# Patient Record
Sex: FEMALE | Race: WHITE | Employment: FULL TIME | URBAN - METROPOLITAN AREA
[De-identification: names, ages, dates, MRNs, and addresses within clinical notes are randomized per-mention and may not be internally consistent; named-entity substitution may affect disease eponyms.]

---

## 2024-03-14 NOTE — PROGRESS NOTES
Social History:   Social History     Tobacco Use    Smoking status: Not on file    Smokeless tobacco: Not on file   Substance Use Topics    Alcohol use: Not on file       ALLERGIES: No Known Allergies     Patient Medications    Current Outpatient Medications   Medication Sig    ibuprofen (ADVIL;MOTRIN) 800 MG tablet     ALPRAZolam (XANAX) 1 MG tablet TAKE 0.5 TO 1 TABLET (0.5 - 1 MG) BY MOUTH AS NEEDED FOR ANXIETY    amphetamine-dextroamphetamine (ADDERALL) 20 MG tablet Take 1 tablet by mouth 3 times daily. Max Daily Amount: 3 tablets    zolpidem (AMBIEN) 10 MG tablet Take 1 tablet by mouth nightly as needed. Max Daily Amount: 10 mg    QUEtiapine (SEROQUEL) 25 MG tablet 1 tablet    Paragard Intrauterine Copper IUD 1 each by IntraUTERine route once     No current facility-administered medications for this visit.         ROS/Meds/PSH/PMH/FH/SH: I personally reviewed the patients standard intake form.     Physical Exam:      Cervical: PE: General: Awake, alert, no distress.  HEENT: Mucous membranes moist and pink. Psych: Appropriate and conversant. Derm: No rash. Gait: Unassisted, non-ataxic.  MS: There is no pain with cervical flexion or extension. She has right neck pain with right cervical facet load.  Left cervical facet load is negative. Strength is 5/5 and symmetric in the BUE's. There is mild pain with ROM of the right shoulder in any plane. Nontender cervical spine centrally.  Mild tenderness to palpation over the right cervical paraspinals and right shoulder.  Neurological: Sensation to light touch is intact in the BUE's. Bilateral Umana's maneuver is negative. Reflexes are trace and symmetric over the bilateral upper extremities.      VITALS: @IPVITALS(8:)@ , @TMAX(24)@       Failed to redirect to the Timeline version of the REVFS SmartLink.             No results found for any visits on 03/19/24.          Assessment and Plan:     ICD-10-CM    1. Cervical pain (neck)  M54.2 MRI CERVICAL SPINE WO CONTRAST

## 2024-03-19 ENCOUNTER — OFFICE VISIT (OUTPATIENT)
Dept: ORTHOPEDIC SURGERY | Age: 34
End: 2024-03-19
Payer: COMMERCIAL

## 2024-03-19 VITALS — BODY MASS INDEX: 24.35 KG/M2 | WEIGHT: 129 LBS | HEIGHT: 61 IN

## 2024-03-19 DIAGNOSIS — M54.2 CERVICAL PAIN (NECK): Primary | ICD-10-CM

## 2024-03-19 DIAGNOSIS — M25.511 RIGHT SHOULDER PAIN, UNSPECIFIED CHRONICITY: ICD-10-CM

## 2024-03-19 PROCEDURE — 99204 OFFICE O/P NEW MOD 45 MIN: CPT | Performed by: PHYSICAL MEDICINE & REHABILITATION

## 2024-03-19 RX ORDER — QUETIAPINE FUMARATE 25 MG/1
25 TABLET, FILM COATED ORAL
COMMUNITY

## 2024-03-19 RX ORDER — ZOLPIDEM TARTRATE 10 MG/1
10 TABLET ORAL NIGHTLY PRN
COMMUNITY

## 2024-03-19 RX ORDER — IBUPROFEN 800 MG/1
TABLET ORAL
COMMUNITY
Start: 2021-11-08

## 2024-03-19 RX ORDER — ALPRAZOLAM 1 MG/1
TABLET ORAL
COMMUNITY

## 2024-03-19 RX ORDER — COPPER 313.4 MG/1
1 INTRAUTERINE DEVICE INTRAUTERINE ONCE
COMMUNITY

## 2024-03-19 RX ORDER — DEXTROAMPHETAMINE SACCHARATE, AMPHETAMINE ASPARTATE, DEXTROAMPHETAMINE SULFATE AND AMPHETAMINE SULFATE 5; 5; 5; 5 MG/1; MG/1; MG/1; MG/1
1 TABLET ORAL 3 TIMES DAILY
COMMUNITY

## 2024-04-02 ENCOUNTER — TELEPHONE (OUTPATIENT)
Dept: ORTHOPEDIC SURGERY | Age: 34
End: 2024-04-02

## 2024-04-02 NOTE — TELEPHONE ENCOUNTER
Call returned to patient and she has been scheduled for a virtual visit with Dr. Patel to discuss MRI results.

## 2024-04-02 NOTE — TELEPHONE ENCOUNTER
PT WAS TOLD HER APPT FOR [id number]  WAS A TELEHEALTH APPT.BY FONT DESK AT [place],SHE WANTS TO KNOW WHEN SHE CAN HAVE PHONE VIIST .THANKS

## 2024-04-03 ENCOUNTER — TELEMEDICINE (OUTPATIENT)
Dept: ORTHOPEDIC SURGERY | Age: 34
End: 2024-04-03
Payer: COMMERCIAL

## 2024-04-03 DIAGNOSIS — M54.2 CERVICAL PAIN (NECK): Primary | ICD-10-CM

## 2024-04-03 PROCEDURE — 99214 OFFICE O/P EST MOD 30 MIN: CPT | Performed by: PHYSICAL MEDICINE & REHABILITATION

## 2024-04-03 NOTE — PROGRESS NOTES
[facility]  Consultation Note  Virtual/Telephone Visit     Patient ID:  Name: Ryder Green  MRN: [id number]  AGE: 33 y.o.  : 1990    Date of Consultation:  April 3, 2024    CC:   Chief Complaint   Patient presents with    Neck Pain         HPI: Ms. Green is a 33-year-old female who presents today for follow-up of neck and shoulder pain.  MRI cervical spine was performed 2024.  I did review these films.  On my review the films, she does have disc protrusions at C4-5, C5-6, C6-7, and to lesser extent C7-T1.  At C5-6, this is most pronounced, resulting in significant bilateral lateral recess narrowing, right greater than left.  She has multilevel degenerative disc changes, and she also has some facet arthropathy.  There is no cord signal change or severe central stenosis.  We discussed these findings at length today.    She continues to have discomfort from the neck, radiating to the right shoulder.  She has also noted some symptoms radiating to her right hand recently.  Over the last week, she has begun experiencing burning paresthesias in her bilateral feet.  It sometimes feels as though she is walking on a pad.  The symptoms are new.  She has never had this before.  She has also experienced 2 episodes of blurred vision and loss of vision in her bilateral eyes over the last week.  1 episode occurred while she was driving, and it was so severe that she had to pull over and wait for the symptoms to subside.  She has never had loss of vision or blurred vision in the past.     Past Medical History Includes: No past medical history on file., No past surgical history on file.  Family History: No family history on file.   Social History:   Social History     Tobacco Use    Smoking status: Not on file    Smokeless tobacco: Not on file   Substance Use Topics    Alcohol use: Not on file       ALLERGIES: No Known Allergies     Patient Medications    Current Outpatient Medications

## 2024-04-08 ENCOUNTER — OFFICE VISIT (OUTPATIENT)
Dept: NEUROLOGY | Age: 34
End: 2024-04-08
Payer: COMMERCIAL

## 2024-04-08 VITALS
BODY MASS INDEX: 24.55 KG/M2 | HEART RATE: 95 BPM | SYSTOLIC BLOOD PRESSURE: 123 MMHG | HEIGHT: 61 IN | WEIGHT: 130 LBS | OXYGEN SATURATION: 98 % | DIASTOLIC BLOOD PRESSURE: 82 MMHG

## 2024-04-08 DIAGNOSIS — G37.9 DEMYELINATING CHANGES IN BRAIN (HCC): ICD-10-CM

## 2024-04-08 DIAGNOSIS — H53.9 VISUAL CHANGES: Primary | ICD-10-CM

## 2024-04-08 DIAGNOSIS — G43.709 CHRONIC MIGRAINE WITHOUT AURA WITHOUT STATUS MIGRAINOSUS, NOT INTRACTABLE: ICD-10-CM

## 2024-04-08 PROCEDURE — 99204 OFFICE O/P NEW MOD 45 MIN: CPT | Performed by: PSYCHIATRY & NEUROLOGY

## 2024-04-08 ASSESSMENT — ENCOUNTER SYMPTOMS
ALLERGIC/IMMUNOLOGIC NEGATIVE: 1
RESPIRATORY NEGATIVE: 1
GASTROINTESTINAL NEGATIVE: 1

## 2024-04-08 NOTE — PROGRESS NOTES
[facility] NEUROLOGY  [address], [address]  [place] [address]  Phone: [phone] Fax [phone]  Dr. Kadeem Lucio      4/8/2024  Ryder Green     Patient is referred by the following provider for consultation regarding as below:       I reviewed the available records and notes and have examined patient with the following findings:     Chief Complaint:  Chief Complaint   Patient presents with    New Patient     Herniated discs in spine, episodes of loss of vision, neuropathy in feet           HPI: This is a right handed 33 y.o.  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  Cervical disc changes that Dr. Patel has been treating.  The patient unfortunately has had 2 episodes in the last 2 weeks.  At work which was comfortable there was not it was not overheated she had an abrupt onset where her visual field in the center became very blurred she describes as a pins-and-needles kind of poking out of the center of her vision.  That actually expanded over a very short period of time she could see color that did not change.  It is started with the center vision in the outside of her center vision was actually clear.  But then it got a little worse.  And within 15 minutes to completely resolved.  With no further episodes.  Until about 4 days later.  After the episode there was no postictal state there is no post headaches there is no symptoms she was normal.  Again 4 days later she noted the same thing like the center of her vision had these pins-and-needles and dots.  But also which was different as it developed also into the left lower visual field was much more blurred.  Distorted vision in the center.  There is no headache associated with it no change in her neck pain.  No weakness numbness tingling of her arms have her.  Other than her left thumb region which I think is

## 2024-04-26 ENCOUNTER — OFFICE VISIT (OUTPATIENT)
Dept: ORTHOPEDIC SURGERY | Age: 34
End: 2024-04-26

## 2024-04-26 DIAGNOSIS — M19.011 ARTHRITIS OF RIGHT ACROMIOCLAVICULAR JOINT: ICD-10-CM

## 2024-04-26 DIAGNOSIS — M25.511 RIGHT SHOULDER PAIN, UNSPECIFIED CHRONICITY: Primary | ICD-10-CM

## 2024-04-26 DIAGNOSIS — G25.89 SCAPULAR DYSKINESIS: ICD-10-CM

## 2024-04-26 DIAGNOSIS — M25.311 SHOULDER INSTABILITY, RIGHT: ICD-10-CM

## 2024-04-26 NOTE — PROGRESS NOTES
Type:   Eval and Treat     Referral Reason:   Patient Preference     Number of Visits Requested:   1      Follow up: Return in about 6 weeks (around 6/7/2024).     4 This is a chronic illness/condition with exacerbation and progression    The patient expressed understanding and agreed with the plan.     Claudia Kaye MD   Orthopaedics and Sports Medicine  [facility]     This document was created using voice recognition software so frequent mistakes are possible. For any concerns about the wording of this document, please contact its creator for further clarification.

## 2024-09-03 ENCOUNTER — OFFICE VISIT (OUTPATIENT)
Dept: NEUROLOGY | Age: 34
End: 2024-09-03
Payer: COMMERCIAL

## 2024-09-03 VITALS
HEART RATE: 83 BPM | OXYGEN SATURATION: 97 % | WEIGHT: 123 LBS | BODY MASS INDEX: 23.22 KG/M2 | HEIGHT: 61 IN | DIASTOLIC BLOOD PRESSURE: 84 MMHG | SYSTOLIC BLOOD PRESSURE: 118 MMHG

## 2024-09-03 DIAGNOSIS — G43.709 CHRONIC MIGRAINE WITHOUT AURA WITHOUT STATUS MIGRAINOSUS, NOT INTRACTABLE: Primary | ICD-10-CM

## 2024-09-03 PROCEDURE — 99214 OFFICE O/P EST MOD 30 MIN: CPT | Performed by: PSYCHIATRY & NEUROLOGY

## 2024-09-03 RX ORDER — RIMEGEPANT SULFATE 75 MG/75MG
TABLET, ORALLY DISINTEGRATING ORAL
Qty: 16 TABLET | Refills: 5 | Status: SHIPPED | OUTPATIENT
Start: 2024-09-03

## 2024-09-03 ASSESSMENT — ENCOUNTER SYMPTOMS
GASTROINTESTINAL NEGATIVE: 1
ALLERGIC/IMMUNOLOGIC NEGATIVE: 1
RESPIRATORY NEGATIVE: 1
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
## 2024-11-14 NOTE — PROGRESS NOTES
Priority:   Routine     Referral Type:   Physical Therapy     Referral Reason:   Patient Preference     Number of Visits Requested:   1    methylPREDNISolone acetate (DEPO-MEDROL) injection 80 mg      Follow up: Return in about 6 weeks (around 12/30/2024).       The patient expressed understanding and agreed with the plan.     Claudia Kaye MD   Orthopaedics and Sports Medicine  [facility]     This document was created using voice recognition software so frequent mistakes are possible. For any concerns about the wording of this document, please contact its creator for further clarification.    Subacromial Subdeltoid Bursa Injection - Right Shoulder     An injection of corticosteroid was discussed today and is indicated for patient’s pain and condition today.  A time out was completed prior to proceeding. Site of injection, procedure, and all team members were identified. Risks were discussed. Patient verbally consented to procedure. Risks explained include infection, bleeding, nerve damage, steroid flare, elevation in blood glucose and pain at the site of injection were discussed at length with the patient.     With the patient sitting upright, a posterior approach to the subacromial space was utilized for this injection procedure. The site of the injection was cleansed chlorhexidine. Following this a vapo coolant spray was utilized to provide local skin anesthesia. A solution of 40mg/ml Depo-medrol, 1cc, and 4cc 1% lidocaine was delivered through a 22 gauge, 1.5\" needle into the subacromial space. The site was again cleansed with an alcohol swab. The patient tolerated this procedure well with no adverse events. There was some notable pain relief reported by the patient prior to leaving the office today. The patient was counseled not to submerge the site for 24 hours, not to perform strenuous activity for the next five days, and if notes any signs or symptoms consistent with joint infection or

## 2024-11-18 ENCOUNTER — OFFICE VISIT (OUTPATIENT)
Dept: ORTHOPEDIC SURGERY | Age: 34
End: 2024-11-18

## 2024-11-18 DIAGNOSIS — M19.011 ARTHRITIS OF RIGHT ACROMIOCLAVICULAR JOINT: ICD-10-CM

## 2024-11-18 DIAGNOSIS — M25.311 SHOULDER INSTABILITY, RIGHT: Primary | ICD-10-CM

## 2024-11-18 DIAGNOSIS — G25.89 SCAPULAR DYSKINESIS: ICD-10-CM

## 2024-11-18 RX ORDER — METHYLPREDNISOLONE ACETATE 80 MG/ML
80 INJECTION, SUSPENSION INTRA-ARTICULAR; INTRALESIONAL; INTRAMUSCULAR; SOFT TISSUE ONCE
Status: COMPLETED | OUTPATIENT
Start: 2024-11-18 | End: 2024-11-18

## 2024-11-18 RX ADMIN — METHYLPREDNISOLONE ACETATE 80 MG: 80 INJECTION, SUSPENSION INTRA-ARTICULAR; INTRALESIONAL; INTRAMUSCULAR; SOFT TISSUE at 10:52
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
## 2024-12-04 ENCOUNTER — OFFICE VISIT (OUTPATIENT)
Dept: ORTHOPEDIC SURGERY | Age: 34
End: 2024-12-04
Payer: COMMERCIAL

## 2024-12-04 VITALS — BODY MASS INDEX: 21.71 KG/M2 | HEIGHT: 61 IN | WEIGHT: 115 LBS

## 2024-12-04 DIAGNOSIS — M54.2 CERVICAL PAIN (NECK): Primary | ICD-10-CM

## 2024-12-04 PROCEDURE — 99214 OFFICE O/P EST MOD 30 MIN: CPT | Performed by: PHYSICAL MEDICINE & REHABILITATION

## 2024-12-04 RX ORDER — MELOXICAM 15 MG/1
15 TABLET ORAL DAILY
Qty: 30 TABLET | Refills: 1 | Status: SHIPPED | OUTPATIENT
Start: 2024-12-04

## 2024-12-04 RX ORDER — LAMOTRIGINE 100 MG/1
TABLET ORAL
COMMUNITY
Start: 2024-11-03

## 2025-01-03 NOTE — PROGRESS NOTES
Name: Ryder Green  YOB: 1990  Gender: female  MRN: [id number]  Date of Encounter:  1/6/2025       CHIEF COMPLAINT:     Chief Complaint   Patient presents with    Follow-up     Right Shoulder        SUBJECTIVE/OBJECTIVE:      HPI:    Patient is a 34 y.o. pleasant female who presents today for a return evaluation of her right shoulder.    Working diagnosis:   1. Shoulder instability, right       LOV: 11/18/2024     Recall hx: Ryder has a chronic history of right shoulder issues, recalling a volleyball incident where she came into the ground and felt 2 pops.  She had x-rays at that time but no further workup.  It has been progressively worsening recently.  She feels pain primarily over her AC joint, however she does have popping in her shoulder and she has knowledge of scapular winging.  She has a history of cervical disc protrusions and is currently followed by Dr. Patel.  She has noted over the past 2 weeks that she has numbness down the arm when sleeping. She also has a hx of EDS.   XR with moderate AC arthritis. Exam, has MDI.      4/26/24: Initial eval. Recommended formal therapy and given referral.      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  She never did any formal therapy for her shoulder.  SA-SD inj performed today. PT ordered. Work restrictions set.      1/6/25: She had initial pain relief with subacromial bursa injection.  Unfortunately over the past 2 weeks she has had return of her shoulder pain that is causing intolerance of her physical therapy exercises.  Even very light weight makes her very sore.  She has pain more posterior and up her neck.  She has had some relief with a few leftover prednisone she found in her bathroom.    PAST HISTORY:   Past medical, surgical, family, social history and allergies reviewed by me. Unchanged from prior visit.     REVIEW OF

## 2025-01-06 ENCOUNTER — OFFICE VISIT (OUTPATIENT)
Dept: ORTHOPEDIC SURGERY | Age: 35
End: 2025-01-06
Payer: COMMERCIAL

## 2025-01-06 DIAGNOSIS — M25.311 SHOULDER INSTABILITY, RIGHT: Primary | ICD-10-CM

## 2025-01-06 PROCEDURE — 99214 OFFICE O/P EST MOD 30 MIN: CPT | Performed by: STUDENT IN AN ORGANIZED HEALTH CARE EDUCATION/TRAINING PROGRAM

## 2025-01-06 RX ORDER — PREDNISONE 5 MG/1
TABLET ORAL
Qty: 1 EACH | Refills: 0 | Status: SHIPPED | OUTPATIENT
Start: 2025-01-06

## 2025-01-07 ENCOUNTER — TELEPHONE (OUTPATIENT)
Dept: ORTHOPEDIC SURGERY | Age: 35
End: 2025-01-07

## 2025-01-07 DIAGNOSIS — M25.311 SHOULDER INSTABILITY, RIGHT: Primary | ICD-10-CM

## 2025-01-07 DIAGNOSIS — M54.2 CERVICAL PAIN (NECK): Primary | ICD-10-CM

## 2025-01-07 DIAGNOSIS — M25.311 SHOULDER INSTABILITY, RIGHT: ICD-10-CM

## 2025-01-07 NOTE — PROGRESS NOTES
Samantha order to PT to include shoulder and previous referral from Dr. Patel for cervical PT as requested by patient/PT

## 2025-01-07 NOTE — TELEPHONE ENCOUNTER
[facility] is calling to see if they can get another order and if it can say cervical and right shoulder. Fax to [phone]

## 2025-01-21 ENCOUNTER — HOSPITAL ENCOUNTER (OUTPATIENT)
Dept: MRI IMAGING | Age: 35
Discharge: HOME | End: 2025-01-24
Attending: STUDENT IN AN ORGANIZED HEALTH CARE EDUCATION/TRAINING PROGRAM
Payer: COMMERCIAL

## 2025-01-21 ENCOUNTER — HOSPITAL ENCOUNTER (OUTPATIENT)
Dept: INTERVENTIONAL RADIOLOGY/VASCULAR | Age: 35
Discharge: HOME | End: 2025-01-24
Attending: STUDENT IN AN ORGANIZED HEALTH CARE EDUCATION/TRAINING PROGRAM
Payer: COMMERCIAL

## 2025-01-21 VITALS
BODY MASS INDEX: 21.71 KG/M2 | SYSTOLIC BLOOD PRESSURE: 133 MMHG | DIASTOLIC BLOOD PRESSURE: 95 MMHG | HEIGHT: 61 IN | WEIGHT: 115 LBS | RESPIRATION RATE: 18 BRPM | OXYGEN SATURATION: 99 % | TEMPERATURE: 97.4 F | HEART RATE: 91 BPM

## 2025-01-21 DIAGNOSIS — M25.311 SHOULDER INSTABILITY, RIGHT: ICD-10-CM

## 2025-01-21 PROCEDURE — 73040 CONTRAST X-RAY OF SHOULDER: CPT

## 2025-01-21 PROCEDURE — 73222 MRI JOINT UPR EXTREM W/DYE: CPT

## 2025-01-21 PROCEDURE — 23350 INJECTION FOR SHOULDER X-RAY: CPT | Performed by: RADIOLOGY

## 2025-01-21 PROCEDURE — 6360000004 HC RX CONTRAST MEDICATION: Performed by: RADIOLOGY

## 2025-01-21 PROCEDURE — 6360000002 HC RX W HCPCS: Performed by: RADIOLOGY

## 2025-01-21 PROCEDURE — A9579 GAD-BASE MR CONTRAST NOS,1ML: HCPCS | Performed by: RADIOLOGY

## 2025-01-21 PROCEDURE — 2709999900 IR ARTHROGRAM SHOULDER RT

## 2025-01-21 PROCEDURE — 77002 NEEDLE LOCALIZATION BY XRAY: CPT | Performed by: RADIOLOGY

## 2025-01-21 RX ORDER — IOPAMIDOL 612 MG/ML
INJECTION, SOLUTION INTRATHECAL PRN
Status: COMPLETED | OUTPATIENT
Start: 2025-01-21 | End: 2025-01-21

## 2025-01-21 RX ORDER — LIDOCAINE HYDROCHLORIDE 20 MG/ML
INJECTION, SOLUTION INFILTRATION; PERINEURAL PRN
Status: COMPLETED | OUTPATIENT
Start: 2025-01-21 | End: 2025-01-21

## 2025-01-21 RX ADMIN — LIDOCAINE HYDROCHLORIDE 5 ML: 20 INJECTION, SOLUTION INFILTRATION; PERINEURAL at 14:48

## 2025-01-21 RX ADMIN — GADOTERIDOL 1.5 ML: 279.3 INJECTION, SOLUTION INTRAVENOUS at 14:49

## 2025-01-21 RX ADMIN — IOPAMIDOL 7 ML: 612 INJECTION, SOLUTION INTRATHECAL at 14:49

## 2025-01-21 ASSESSMENT — PAIN - FUNCTIONAL ASSESSMENT: PAIN_FUNCTIONAL_ASSESSMENT: 0-10

## 2025-01-21 NOTE — FLOWSHEET NOTE
Recovery period without difficulty. Pt alert and oriented and denies pain. Dressing is clean, dry, and intact. Reviewed discharge instructions with patient and Deb, both verbalized understanding. Pt escorted to MRI.

## 2025-01-21 NOTE — DISCHARGE INSTRUCTIONS
If you have any questions about your procedure, please call the Interventional Radiology department at [phone].   After business hours (5pm) and weekends, call the answering service at [phone] and ask for the Radiologist on call to be paged.     Si tiene Preguntas acerca del procedimiento, por favor llame al departamento de Radiología Intervencional al [phone].   Después de horas de oficina (5 pm) y los fines de semana, llamar al servicio de llamadas al [phone] y pregunte por el Radiologo de valentine.      Interventional Radiology General Nurse Discharge    After general anesthesia or intravenous sedation, for 24 hours or while taking prescription Narcotics:  Limit your activities  Do not drive and operate hazardous machinery  Do not make important personal or business decisions  Do  not drink alcoholic beverages  If you have not urinated within 8 hours after discharge, please contact your surgeon on call.    * Please give a list of your current medications to your Primary Care Provider.  * Please update this list whenever your medications are discontinued, doses are     changed, or new medications (including over-the-counter products) are added.  * Please carry medication information at all times in case of emergency situations.    These are general instructions for a healthy lifestyle:    No smoking/ No tobacco products/ Avoid exposure to second hand smoke  Surgeon General's Warning:  Quitting smoking now greatly reduces serious risk to your health.    Obesity, smoking, and sedentary lifestyle greatly increases your risk for illness  A healthy diet, regular physical exercise & weight monitoring are important for maintaining a healthy lifestyle    You may be retaining fluid if you have a history of heart failure or if you experience any of the following symptoms:  Weight gain of 3 pounds or more overnight or 5 pounds in a week, increased swelling in our hands or feet or shortness of breath

## 2025-02-05 NOTE — PROGRESS NOTES
Name: Ryder Green  YOB: 1990  Gender: female  MRN: [id number]      CC: Results (Right Shoulder MRI)       HPI: Ryder Green is a 34 y.o. female who returns for follow up and MRI and MR arthrogram results of the R shoulder.     Recall hx: Ryder has a chronic history of right shoulder issues, recalling a volleyball incident where she came into the ground and felt 2 pops.  She had x-rays at that time but no further workup.  It has been progressively worsening recently.  She feels pain primarily over her AC joint, however she does have popping in her shoulder and she has knowledge of scapular winging.  She has a history of cervical disc protrusions and is currently followed by Dr. Patel.  She has noted over the past 2 weeks that she has numbness down the arm when sleeping. She also has a hx of EDS.   XR with moderate AC arthritis. Exam concerning for MDI, ACI arthritis.     4/26/24: Initial eval. Recommended formal therapy and given referral.      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  She never did any formal therapy for her shoulder.  SA-SD inj performed today. PT ordered. Work restrictions set.      1/6/25: She had initial pain relief with subacromial bursa injection.  Unfortunately over the past 2 weeks she has had return of her shoulder pain that is causing intolerance of her physical therapy exercises.  Even very light weight makes her very sore.  She has pain more posterior and up her neck.  She has had some relief with a few leftover prednisone she found in her bathroom. Prednisone and voltaren Rx submitted.     Physical Examination:  General: no acute distress, well appearing  Lungs: no respiratory distress or stridor   CV: regular rhythm by pulse, normal capillary refill    Right Shoulder:      Inspection: no biceps deformity; no notable atrophy or erythema  ROM active

## 2025-02-06 ENCOUNTER — OFFICE VISIT (OUTPATIENT)
Dept: ORTHOPEDIC SURGERY | Age: 35
End: 2025-02-06

## 2025-02-06 DIAGNOSIS — M19.011 ARTHRITIS OF RIGHT ACROMIOCLAVICULAR JOINT: ICD-10-CM

## 2025-02-06 DIAGNOSIS — M25.311 SHOULDER INSTABILITY, RIGHT: Primary | ICD-10-CM

## 2025-02-06 RX ORDER — METHYLPREDNISOLONE ACETATE 80 MG/ML
80 INJECTION, SUSPENSION INTRA-ARTICULAR; INTRALESIONAL; INTRAMUSCULAR; SOFT TISSUE ONCE
Status: COMPLETED | OUTPATIENT
Start: 2025-02-06 | End: 2025-02-06

## 2025-02-06 RX ADMIN — METHYLPREDNISOLONE ACETATE 80 MG: 80 INJECTION, SUSPENSION INTRA-ARTICULAR; INTRALESIONAL; INTRAMUSCULAR; SOFT TISSUE at 09:11

## 2025-02-14 ENCOUNTER — TELEPHONE (OUTPATIENT)
Dept: ORTHOPEDIC SURGERY | Age: 35
End: 2025-02-14

## 2025-02-14 NOTE — TELEPHONE ENCOUNTER
Spoke with patient regarding further assessment of her MRI between myself, another colleague, and one of our radiologist.  We agree that there may be a small labral tear but no evidence of significant capsular tear.    She is optimistic about the recent glenohumeral joint injection that she has noticed some improvement.  We discussed continuing physical therapy at least 2 times a week and she is on board without, trying to prevent need for surgery.  She is an [facility] on [place].    We will anticipate having her return in 6 weeks for reevaluation.

## 2025-02-24 ENCOUNTER — TELEPHONE (OUTPATIENT)
Dept: NEUROLOGY | Age: 35
End: 2025-02-24

## 2025-02-24 RX ORDER — RIMEGEPANT SULFATE 75 MG/75MG
TABLET, ORALLY DISINTEGRATING ORAL
Qty: 16 TABLET | Refills: 5 | Status: SHIPPED | OUTPATIENT
Start: 2025-02-24

## 2025-03-25 NOTE — PROGRESS NOTES
Name: Ryder Green  YOB: 1990  Gender: female  MRN: [id number]  Date of Encounter:  3/27/2025       CHIEF COMPLAINT:     Chief Complaint   Patient presents with    Follow-up     Right Shoulder        SUBJECTIVE/OBJECTIVE:      HPI:    Patient is a 34 y.o. pleasant female who presents today for a return evaluation of her right shoulder.    Working diagnosis:   1. Shoulder instability, right    2. Arthritis of right acromioclavicular joint       LOV: 2/6/2025     Recall Hx:   Ryder has a chronic history of right shoulder issues, recalling a volleyball incident where she came into the ground and felt 2 pops.  She had x-rays at that time but no further workup.  It has been progressively worsening recently.  She feels pain primarily over her AC joint, however she does have popping in her shoulder and she has knowledge of scapular winging.  She has a history of cervical disc protrusions and is currently followed by Dr. Patel.  She has noted over the past 2 weeks that she has numbness down the arm when sleeping. She also has a hx of EDS.   XR with moderate AC arthritis. Exam concerning for MDI, AC arthritis.     4/26/24: Initial eval. Recommended formal therapy and given referral.      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  She never did any formal therapy for her shoulder.  SA-SD inj performed today. PT ordered. Work restrictions set.      1/6/25: She had initial pain relief with subacromial bursa injection.  Unfortunately over the past 2 weeks she has had return of her shoulder pain that is causing intolerance of her physical therapy exercises.  Even very light weight makes her very sore.  She has pain more posterior and up her neck.  She has had some relief with a few leftover prednisone she found in her bathroom. Prednisone and voltaren Rx submitted.     2/6/25: Returned for MRI

## 2025-03-27 ENCOUNTER — OFFICE VISIT (OUTPATIENT)
Dept: ORTHOPEDIC SURGERY | Age: 35
End: 2025-03-27
Payer: COMMERCIAL

## 2025-03-27 DIAGNOSIS — M25.311 SHOULDER INSTABILITY, RIGHT: Primary | ICD-10-CM

## 2025-03-27 DIAGNOSIS — M19.011 ARTHRITIS OF RIGHT ACROMIOCLAVICULAR JOINT: ICD-10-CM

## 2025-03-27 PROCEDURE — 99213 OFFICE O/P EST LOW 20 MIN: CPT | Performed by: STUDENT IN AN ORGANIZED HEALTH CARE EDUCATION/TRAINING PROGRAM

## 2025-04-06 ENCOUNTER — PATIENT MESSAGE (OUTPATIENT)
Dept: ORTHOPEDIC SURGERY | Age: 35
End: 2025-04-06

## 2025-04-07 RX ORDER — PREDNISONE 5 MG/1
TABLET ORAL
Qty: 1 EACH | Refills: 0 | Status: SHIPPED | OUTPATIENT
Start: 2025-04-07

## 2025-04-07 RX ORDER — ORPHENADRINE CITRATE 100 MG/1
100 TABLET ORAL 2 TIMES DAILY PRN
Qty: 45 TABLET | Refills: 1 | Status: SHIPPED | OUTPATIENT
Start: 2025-04-07 | End: 2025-05-07

## 2025-05-15 RX ORDER — ORPHENADRINE CITRATE 100 MG/1
100 TABLET ORAL 2 TIMES DAILY PRN
Qty: 45 TABLET | Refills: 1 | OUTPATIENT
Start: 2025-05-15 | End: 2025-06-14

## 2025-05-20 DIAGNOSIS — M54.2 CERVICAL PAIN (NECK): Primary | ICD-10-CM

## 2025-05-20 RX ORDER — METHOCARBAMOL 750 MG/1
750 TABLET, FILM COATED ORAL 3 TIMES DAILY
Qty: 45 TABLET | Refills: 1 | Status: SHIPPED | OUTPATIENT
Start: 2025-05-20 | End: 2025-06-19

## 2025-06-10 ENCOUNTER — PATIENT MESSAGE (OUTPATIENT)
Dept: ORTHOPEDIC SURGERY | Age: 35
End: 2025-06-10

## 2025-06-10 DIAGNOSIS — M50.90 CERVICAL DISC DISEASE: ICD-10-CM

## 2025-06-10 DIAGNOSIS — M54.12 CERVICAL RADICULOPATHY: Primary | ICD-10-CM

## 2025-07-18 DIAGNOSIS — G43.709 CHRONIC MIGRAINE WITHOUT AURA WITHOUT STATUS MIGRAINOSUS, NOT INTRACTABLE: ICD-10-CM

## 2025-07-21 RX ORDER — RIMEGEPANT SULFATE 75 MG/75MG
TABLET, ORALLY DISINTEGRATING ORAL
Qty: 16 TABLET | Refills: 5 | Status: SHIPPED | OUTPATIENT
Start: 2025-07-21

## 2025-07-28 ENCOUNTER — PATIENT MESSAGE (OUTPATIENT)
Dept: ORTHOPEDIC SURGERY | Age: 35
End: 2025-07-28

## 2025-07-28 DIAGNOSIS — M25.561 RIGHT KNEE PAIN, UNSPECIFIED CHRONICITY: Primary | ICD-10-CM

## 2025-07-29 RX ORDER — METHOCARBAMOL 750 MG/1
750 TABLET, FILM COATED ORAL 3 TIMES DAILY
Qty: 45 TABLET | Refills: 1 | Status: CANCELLED | OUTPATIENT
Start: 2025-07-29 | End: 2025-08-28